# Patient Record
Sex: MALE | Race: BLACK OR AFRICAN AMERICAN | NOT HISPANIC OR LATINO | Employment: FULL TIME | URBAN - METROPOLITAN AREA
[De-identification: names, ages, dates, MRNs, and addresses within clinical notes are randomized per-mention and may not be internally consistent; named-entity substitution may affect disease eponyms.]

---

## 2018-09-14 ENCOUNTER — APPOINTMENT (OUTPATIENT)
Dept: SLEEP MEDICINE | Facility: HOSPITAL | Age: 28
End: 2018-09-14
Attending: INTERNAL MEDICINE

## 2018-09-20 ENCOUNTER — HOSPITAL ENCOUNTER (OUTPATIENT)
Dept: SLEEP MEDICINE | Facility: HOSPITAL | Age: 28
Discharge: HOME | End: 2018-09-20
Admitting: INTERNAL MEDICINE

## 2018-09-20 ENCOUNTER — OFFICE VISIT (OUTPATIENT)
Dept: SLEEP MEDICINE | Facility: HOSPITAL | Age: 28
End: 2018-09-20
Attending: INTERNAL MEDICINE

## 2018-09-20 VITALS
HEART RATE: 78 BPM | WEIGHT: 174 LBS | SYSTOLIC BLOOD PRESSURE: 149 MMHG | BODY MASS INDEX: 26.37 KG/M2 | DIASTOLIC BLOOD PRESSURE: 88 MMHG | HEIGHT: 68 IN | OXYGEN SATURATION: 98 %

## 2018-09-20 DIAGNOSIS — G47.10 HYPERSOMNIA WITH SLEEP APNEA: Primary | ICD-10-CM

## 2018-09-20 DIAGNOSIS — G47.30 HYPERSOMNIA WITH SLEEP APNEA: Primary | ICD-10-CM

## 2018-09-20 DIAGNOSIS — G47.10 HYPERSOMNIA WITH SLEEP APNEA: ICD-10-CM

## 2018-09-20 DIAGNOSIS — G47.30 HYPERSOMNIA WITH SLEEP APNEA: ICD-10-CM

## 2018-09-20 PROCEDURE — 95810 POLYSOM 6/> YRS 4/> PARAM: CPT

## 2018-09-20 PROCEDURE — 95810 POLYSOM 6/> YRS 4/> PARAM: CPT | Performed by: INTERNAL MEDICINE

## 2018-09-20 PROCEDURE — G0463 HOSPITAL OUTPT CLINIC VISIT: HCPCS

## 2018-09-20 PROCEDURE — 99204 OFFICE O/P NEW MOD 45 MIN: CPT | Performed by: INTERNAL MEDICINE
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
## 2018-09-21 ENCOUNTER — HOSPITAL ENCOUNTER (OUTPATIENT)
Dept: SLEEP MEDICINE | Facility: HOSPITAL | Age: 28
Discharge: HOME | End: 2018-09-21
Admitting: INTERNAL MEDICINE

## 2018-09-21 DIAGNOSIS — G47.30 HYPERSOMNIA WITH SLEEP APNEA: ICD-10-CM

## 2018-09-21 DIAGNOSIS — G47.10 HYPERSOMNIA WITH SLEEP APNEA: ICD-10-CM

## 2018-09-21 LAB
AMPHET+METHAMPHET UR QL: NEGATIVE
BARBITURATES UR QL SCN: NEGATIVE
BENZODIAZ UR QL SCN: NEGATIVE
CANNABINOIDS SERPL QL: NEGATIVE
COCAINE UR QL: NEGATIVE
METHADONE UR QL SCN: NEGATIVE
OPIATES UR QL: NEGATIVE
OXYCODONE UR QL SCN: NEGATIVE

## 2018-09-21 PROCEDURE — 80307 DRUG TEST PRSMV CHEM ANLYZR: CPT | Performed by: INTERNAL MEDICINE

## 2018-09-21 PROCEDURE — 95805 MULTIPLE SLEEP LATENCY TEST: CPT

## 2018-09-21 PROCEDURE — 95805 MULTIPLE SLEEP LATENCY TEST: CPT | Performed by: INTERNAL MEDICINE

## 2018-09-29 PROBLEM — G47.30 HYPERSOMNIA WITH SLEEP APNEA: Status: ACTIVE | Noted: 2018-09-29

## 2018-09-29 PROBLEM — G47.10 HYPERSOMNIA WITH SLEEP APNEA: Status: ACTIVE | Noted: 2018-09-29

## 2018-10-04 ENCOUNTER — OFFICE VISIT (OUTPATIENT)
Dept: SLEEP MEDICINE | Facility: HOSPITAL | Age: 28
End: 2018-10-04
Attending: INTERNAL MEDICINE

## 2018-10-04 VITALS
DIASTOLIC BLOOD PRESSURE: 72 MMHG | SYSTOLIC BLOOD PRESSURE: 120 MMHG | HEIGHT: 69 IN | OXYGEN SATURATION: 98 % | WEIGHT: 185.8 LBS | HEART RATE: 77 BPM | BODY MASS INDEX: 27.52 KG/M2

## 2018-10-04 DIAGNOSIS — G47.411 NARCOLEPSY WITH CATAPLEXY: Primary | ICD-10-CM

## 2018-10-04 PROCEDURE — G0463 HOSPITAL OUTPT CLINIC VISIT: HCPCS

## 2018-10-04 PROCEDURE — 99214 OFFICE O/P EST MOD 30 MIN: CPT | Performed by: INTERNAL MEDICINE

## 2018-10-14 PROBLEM — G47.411 NARCOLEPSY WITH CATAPLEXY: Status: ACTIVE | Noted: 2018-10-14

## 2018-12-28 RX ORDER — MODAFINIL 200 MG/1
TABLET ORAL
Qty: 90 TABLET | Refills: 0 | Status: SHIPPED | OUTPATIENT
Start: 2018-12-28 | End: 2019-07-23 | Stop reason: SDUPTHER

## 2019-07-23 RX ORDER — MODAFINIL 200 MG/1
TABLET ORAL
Qty: 90 TABLET | Refills: 0 | Status: SHIPPED | OUTPATIENT
Start: 2019-07-23 | End: 2019-07-25 | Stop reason: SDUPTHER

## 2019-07-23 RX ORDER — MODAFINIL 200 MG/1
TABLET ORAL
Qty: 90 TABLET | Refills: 0 | Status: SHIPPED | OUTPATIENT
Start: 2019-07-23 | End: 2019-07-23

## 2019-07-25 ENCOUNTER — OFFICE VISIT (OUTPATIENT)
Dept: SLEEP MEDICINE | Facility: HOSPITAL | Age: 29
End: 2019-07-25

## 2019-07-25 VITALS — HEIGHT: 69 IN | WEIGHT: 203.4 LBS | HEART RATE: 67 BPM | BODY MASS INDEX: 30.13 KG/M2 | OXYGEN SATURATION: 98 %

## 2019-07-25 DIAGNOSIS — G47.14 HYPERSOMNIA DUE TO MEDICAL CONDITION: Primary | ICD-10-CM

## 2019-07-25 DIAGNOSIS — G47.411 NARCOLEPSY WITH CATAPLEXY: ICD-10-CM

## 2019-07-25 PROCEDURE — G0463 HOSPITAL OUTPT CLINIC VISIT: HCPCS

## 2019-07-25 PROCEDURE — 99213 OFFICE O/P EST LOW 20 MIN: CPT | Performed by: INTERNAL MEDICINE

## 2019-07-25 RX ORDER — MODAFINIL 200 MG/1
TABLET ORAL
Qty: 90 TABLET | Refills: 1 | Status: SHIPPED | OUTPATIENT
Start: 2019-07-25 | End: 2020-02-06 | Stop reason: SDUPTHER

## 2019-07-28 PROBLEM — G47.14 HYPERSOMNIA DUE TO MEDICAL CONDITION: Status: ACTIVE | Noted: 2019-07-28

## 2019-12-18 ENCOUNTER — APPOINTMENT (OUTPATIENT)
Dept: SLEEP MEDICINE | Facility: HOSPITAL | Age: 29
End: 2019-12-18

## 2020-01-22 ENCOUNTER — APPOINTMENT (OUTPATIENT)
Dept: SLEEP MEDICINE | Facility: HOSPITAL | Age: 30
End: 2020-01-22

## 2020-02-06 ENCOUNTER — OFFICE VISIT (OUTPATIENT)
Dept: SLEEP MEDICINE | Facility: HOSPITAL | Age: 30
End: 2020-02-06

## 2020-02-06 VITALS — OXYGEN SATURATION: 97 % | BODY MASS INDEX: 29.12 KG/M2 | HEART RATE: 78 BPM | HEIGHT: 69 IN | WEIGHT: 196.6 LBS

## 2020-02-06 DIAGNOSIS — G47.411 NARCOLEPSY WITH CATAPLEXY: Primary | ICD-10-CM

## 2020-02-06 DIAGNOSIS — G47.14 HYPERSOMNIA DUE TO MEDICAL CONDITION: ICD-10-CM

## 2020-02-06 PROCEDURE — G0463 HOSPITAL OUTPT CLINIC VISIT: HCPCS

## 2020-02-06 PROCEDURE — 99213 OFFICE O/P EST LOW 20 MIN: CPT | Performed by: INTERNAL MEDICINE

## 2020-02-06 RX ORDER — MODAFINIL 200 MG/1
TABLET ORAL
Qty: 180 TABLET | Refills: 1 | Status: SHIPPED | OUTPATIENT
Start: 2020-02-06 | End: 2020-07-31 | Stop reason: SDUPTHER

## 2020-02-06 NOTE — PROGRESS NOTES
"[facility] Up [facility] Note     Chief Complaint: Hypersomnolence    Primary Care Physician: Yakelin Bonds MD    Interval History:   I last saw the patient in July.  He is taking Provigil 200 mg every morning.  About 20% of the time he will take a second 1.  He is stable without new complaints.  He goes to bed at 10 PM and awakens at 4:30 AM.  Plessis Sleepiness Scale is abnormal 19.    Review of Systems:    A complete review of systems was done and all were negative with the exception of the above    Social History:    Social History     Socioeconomic History   • Marital status:      Spouse name: Not on file   • Number of children: Not on file   • Years of education: Not on file   • Highest education level: Not on file   Tobacco Use   • Smoking status: Current Some Day Smoker     Packs/day: 1.00     Years: 6.00     Pack years: 6.00     Types: Cigarettes   • Smokeless tobacco: Never Used   Substance and Sexual Activity   • Alcohol use: No   • Drug use: No   • Sexual activity: Defer       Allergies:  Patient has no known allergies.     Medication Review:  Reviewed.      Vital Signs:    Vitals:    02/06/20 1022   Pulse: 78   SpO2: 97%   Weight: 89.2 kg (196 lb 9.6 oz)   Height: 175.3 cm (69\")     Body mass index is 29.03 kg/m².    Physical Exam:    Constitutional:  Well developed 30 y.o. male that appears in no apparent distress.  Awake & oriented times 3.  Normal mood with normal recent and remote memory and normal judgement.  Eyes:  Conjunctivae normal.  Oropharynx:  moist mucous membranes without exudate and a large tongue and uvula and moderate narrowing of the posterior pharyngeal opening and class II-3 Mallampati airway     Impression:   The patient has a history of marked hypersomnolence with an average sleep latency of 3.2 minutes for 5 daytime naps during a multiple sleep latency test 9/21/2018 following 7.1 hours of sleep with an overnight polysomnogram 9/20/2018 (174 pounds). The patient " had a short REM latency on his overnight polysomnogram and also had one REM onset during his multiple sleep latency test.  The patient also describes some symptoms that could be suggestive of cataplexy.  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    Plan:  Good sleep hygiene measures should be maintained.  Weight loss would be beneficial in this patient who is nearly obese by BMI.  The patient is benefiting from the treatment being provided.     Patient will continue Provigil 200 mg every morning and may take a second 1 around noon as needed.  #180 for 3 months prescribed.  1 refill provided.    The patient will call for any problems and will follow up in 6 months.      Cm Gomes MD  Sleep Medicine  02/06/20  10:45 AM

## 2020-07-31 DIAGNOSIS — G47.411 NARCOLEPSY WITH CATAPLEXY: ICD-10-CM

## 2020-07-31 DIAGNOSIS — G47.14 HYPERSOMNIA DUE TO MEDICAL CONDITION: ICD-10-CM

## 2020-07-31 RX ORDER — MODAFINIL 200 MG/1
TABLET ORAL
Qty: 180 TABLET | Refills: 0 | Status: SHIPPED | OUTPATIENT
Start: 2020-07-31 | End: 2020-08-05 | Stop reason: SDUPTHER

## 2020-07-31 RX ORDER — MODAFINIL 200 MG/1
TABLET ORAL
Qty: 180 TABLET | Refills: 0 | Status: SHIPPED | OUTPATIENT
Start: 2020-07-31 | End: 2020-07-31 | Stop reason: SDUPTHER

## 2020-08-05 DIAGNOSIS — G47.411 NARCOLEPSY WITH CATAPLEXY: ICD-10-CM

## 2020-08-05 DIAGNOSIS — G47.14 HYPERSOMNIA DUE TO MEDICAL CONDITION: ICD-10-CM

## 2020-08-05 RX ORDER — MODAFINIL 200 MG/1
TABLET ORAL
Qty: 180 TABLET | Refills: 0 | Status: SHIPPED | OUTPATIENT
Start: 2020-08-05

## 2020-08-20 ENCOUNTER — APPOINTMENT (OUTPATIENT)
Dept: SLEEP MEDICINE | Facility: HOSPITAL | Age: 30
End: 2020-08-20

## 2021-04-16 ENCOUNTER — BULK ORDERING (OUTPATIENT)
Dept: CASE MANAGEMENT | Facility: OTHER | Age: 31
End: 2021-04-16

## 2021-04-16 DIAGNOSIS — Z23 IMMUNIZATION DUE: ICD-10-CM
